# Patient Record
Sex: FEMALE | Race: WHITE | Employment: FULL TIME | URBAN - METROPOLITAN AREA
[De-identification: names, ages, dates, MRNs, and addresses within clinical notes are randomized per-mention and may not be internally consistent; named-entity substitution may affect disease eponyms.]

---

## 2017-05-25 ENCOUNTER — EMPLOYEE WELLNESS (OUTPATIENT)
Dept: OTHER | Age: 37
End: 2017-05-25

## 2017-05-25 LAB
CHOLESTEROL, TOTAL: 187 MG/DL (ref 0–199)
FASTING: YES
GLUCOSE BLD-MCNC: 86 MG/DL (ref 74–109)
HDLC SERPL-MCNC: 83 MG/DL (ref 40–90)
LDL CHOLESTEROL CALCULATED: 95 MG/DL
TRIGL SERPL-MCNC: 45 MG/DL (ref 0–199)

## 2018-03-07 ENCOUNTER — EMPLOYEE WELLNESS (OUTPATIENT)
Dept: OTHER | Age: 38
End: 2018-03-07

## 2018-03-07 LAB
CHOLESTEROL, TOTAL: 211 MG/DL (ref 0–199)
FASTING: YES
GLUCOSE BLD-MCNC: 83 MG/DL (ref 74–109)
HDLC SERPL-MCNC: 96 MG/DL (ref 40–90)
LDL CHOLESTEROL CALCULATED: 100 MG/DL
TRIGL SERPL-MCNC: 76 MG/DL (ref 0–199)

## 2018-03-20 VITALS — BODY MASS INDEX: 23.78 KG/M2 | WEIGHT: 161 LBS

## 2018-04-02 VITALS — BODY MASS INDEX: 22.59 KG/M2 | WEIGHT: 153 LBS

## 2018-07-09 ENCOUNTER — OFFICE VISIT (OUTPATIENT)
Dept: FAMILY MEDICINE CLINIC | Age: 38
End: 2018-07-09
Payer: COMMERCIAL

## 2018-07-09 VITALS
RESPIRATION RATE: 12 BRPM | HEIGHT: 69 IN | HEART RATE: 72 BPM | DIASTOLIC BLOOD PRESSURE: 70 MMHG | WEIGHT: 155.5 LBS | BODY MASS INDEX: 23.03 KG/M2 | SYSTOLIC BLOOD PRESSURE: 118 MMHG

## 2018-07-09 DIAGNOSIS — Z00.00 WELL ADULT HEALTH CHECK: Primary | ICD-10-CM

## 2018-07-09 PROCEDURE — 99395 PREV VISIT EST AGE 18-39: CPT | Performed by: FAMILY MEDICINE

## 2018-07-09 ASSESSMENT — PATIENT HEALTH QUESTIONNAIRE - PHQ9
1. LITTLE INTEREST OR PLEASURE IN DOING THINGS: 0
2. FEELING DOWN, DEPRESSED OR HOPELESS: 0
SUM OF ALL RESPONSES TO PHQ9 QUESTIONS 1 & 2: 0
SUM OF ALL RESPONSES TO PHQ QUESTIONS 1-9: 0

## 2018-07-09 ASSESSMENT — ENCOUNTER SYMPTOMS
RESPIRATORY NEGATIVE: 1
GASTROINTESTINAL NEGATIVE: 1

## 2018-07-09 NOTE — PROGRESS NOTES
Subjective:      Patient ID: Bianca Rhoades is a 41 y.o. female. HPI:    Chief Complaint   Patient presents with    Annual Exam     Annual wellness. Labs look great. Well female UTD, Cristofer Chamberlainanastacio. There is no problem list on file for this patient. There is no problem list on file for this patient. No past surgical history on file. Review of Systems   Constitutional: Negative. HENT: Negative. Respiratory: Negative. Cardiovascular: Negative. Gastrointestinal: Negative. Musculoskeletal: Negative. All other systems reviewed and are negative. Objective:   Physical Exam   Constitutional: She is oriented to person, place, and time. She appears well-developed and well-nourished. HENT:   Head: Normocephalic and atraumatic. Right Ear: Tympanic membrane normal.   Left Ear: Tympanic membrane normal.   Mouth/Throat: Oropharynx is clear and moist and mucous membranes are normal.   Cardiovascular: Normal rate, regular rhythm and normal heart sounds. No murmur heard. Pulmonary/Chest: Effort normal and breath sounds normal.   Abdominal: Soft. Bowel sounds are normal.   Musculoskeletal: She exhibits no edema. Neurological: She is alert and oriented to person, place, and time. Skin: Skin is warm and dry. Psychiatric: She has a normal mood and affect. Her behavior is normal.   Nursing note and vitals reviewed. Assessment:       Diagnosis Orders   1.  Well adult health check             Plan:      -  Healthy lifestyle discussed  -  Well female UTD  -  Labs reviewed, look great  -  RTO prn

## 2018-07-09 NOTE — PROGRESS NOTES
Visit Information    Have you changed or started any medications since your last visit including any over-the-counter medicines, vitamins, or herbal medicines? no   Are you having any side effects from any of your medications? -  no  Have you stopped taking any of your medications? Is so, why? -  no    Have you seen any other physician or provider since your last visit? No  Have you had any other diagnostic tests since your last visit? No  Have you been seen in the emergency room and/or had an admission to a hospital since we last saw you? No  Have you had your routine dental cleaning in the past 6 months? yes - 5/2018    Have you activated your [employer] account? If not, what are your barriers?  Yes     Patient Care Team:  Jocelyn Emerson, DO as PCP - [facility] [employer] [facility], DO as PCP - [facility] Attributed Provider  Osiris Perales MD as Obstetrician (Obstetrics & Gynecology)    Medical History Review  Past Medical, Family, and Social History reviewed and does contribute to the patient presenting condition    Health Maintenance   Topic Date Due    DTaP/Tdap/Td vaccine (1 - Tdap) 01/29/2001    Cervical cancer screen  01/29/2003    Flu vaccine (1) 09/01/2018    HIV screen  Completed

## 2018-07-27 ENCOUNTER — HOSPITAL ENCOUNTER (OUTPATIENT)
Dept: MAMMOGRAPHY | Age: 38
Discharge: HOME | End: 2018-07-27
Payer: COMMERCIAL

## 2018-07-27 DIAGNOSIS — Z12.31 VISIT FOR SCREENING MAMMOGRAM: ICD-10-CM

## 2018-07-27 PROCEDURE — 77067 SCR MAMMO BI INCL CAD: CPT

## 2018-09-20 ENCOUNTER — OFFICE VISIT (OUTPATIENT)
Dept: FAMILY MEDICINE CLINIC | Age: 38
End: 2018-09-20
Payer: COMMERCIAL

## 2018-09-20 VITALS
DIASTOLIC BLOOD PRESSURE: 68 MMHG | WEIGHT: 148.5 LBS | SYSTOLIC BLOOD PRESSURE: 106 MMHG | RESPIRATION RATE: 16 BRPM | BODY MASS INDEX: 21.26 KG/M2 | HEART RATE: 72 BPM | HEIGHT: 70 IN

## 2018-09-20 DIAGNOSIS — Z41.1 ENCOUNTER FOR BREAST AUGMENTATION: ICD-10-CM

## 2018-09-20 DIAGNOSIS — Z01.818 PRE-OP EVALUATION: Primary | ICD-10-CM

## 2018-09-20 PROCEDURE — 99243 OFF/OP CNSLTJ NEW/EST LOW 30: CPT | Performed by: FAMILY MEDICINE

## 2018-09-20 ASSESSMENT — ENCOUNTER SYMPTOMS
GASTROINTESTINAL NEGATIVE: 1
RESPIRATORY NEGATIVE: 1

## 2019-04-05 LAB
CHOLESTEROL, TOTAL: 200 MG/DL (ref 0–199)
FASTING: YES
GLUCOSE BLD-MCNC: 79 MG/DL (ref 74–109)
HDLC SERPL-MCNC: 86 MG/DL (ref 40–90)
LDL CHOLESTEROL CALCULATED: 104 MG/DL
TRIGL SERPL-MCNC: 49 MG/DL (ref 0–199)

## 2021-08-18 LAB
CHOLESTEROL, TOTAL: 194 MG/DL (ref 0–199)
FASTING: YES
GLUCOSE BLD-MCNC: 66 MG/DL (ref 74–109)
HDLC SERPL-MCNC: 78 MG/DL (ref 40–90)
LDL CHOLESTEROL CALCULATED: 103 MG/DL
TRIGL SERPL-MCNC: 66 MG/DL (ref 0–199)

## 2022-01-14 ENCOUNTER — PATIENT MESSAGE (OUTPATIENT)
Dept: FAMILY MEDICINE CLINIC | Age: 42
End: 2022-01-14

## 2022-01-14 RX ORDER — AMOXICILLIN AND CLAVULANATE POTASSIUM 875; 125 MG/1; MG/1
1 TABLET, FILM COATED ORAL 2 TIMES DAILY WITH MEALS
Qty: 14 TABLET | Refills: 0 | Status: SHIPPED | OUTPATIENT
Start: 2022-01-14 | End: 2022-01-21

## 2022-01-14 NOTE — TELEPHONE ENCOUNTER
From: Bing Primrose  To: Dr. Elissa Vidales  Sent: 1/14/2022 8:36 AM EST  Subject: Antibiotic    Hey Dr. Mike Hein  I'm pretty sure i have a sinus infection. Symptoms for 14 days. I probably originally had omicron but i continue to have tons of green nasal drainage, post nasal drip, and sore throat. Are you able to send in an antibiotic to [facility]? I'm allergic to Sulfa.

## 2022-08-03 ENCOUNTER — TELEMEDICINE (OUTPATIENT)
Dept: FAMILY MEDICINE CLINIC | Age: 42
End: 2022-08-03
Payer: COMMERCIAL

## 2022-08-03 DIAGNOSIS — R31.0 GROSS HEMATURIA: Primary | ICD-10-CM

## 2022-08-03 PROCEDURE — 99213 OFFICE O/P EST LOW 20 MIN: CPT | Performed by: FAMILY MEDICINE

## 2022-08-03 ASSESSMENT — ENCOUNTER SYMPTOMS
RESPIRATORY NEGATIVE: 1
GASTROINTESTINAL NEGATIVE: 1
ABDOMINAL PAIN: 0

## 2022-08-03 NOTE — PROGRESS NOTES
Flora Hercules (:  1980) is a Established patient, here for evaluation of the following:           Subjective   HPI:    Chief Complaint   Patient presents with    Hematuria     Pt presents today with c/o blood in urine that started last week. Overall better this week after increasing her fluid intake. No other urinary symptoms. No hx of stones. No vaginal bleeding noted. No abdominal or flank pain. Recently started exercising prior to the gross hematuria, worse after running a 5K. Non-smoker. There is no problem list on file for this patient. No past surgical history on file. Social History     Tobacco Use    Smoking status: Never    Smokeless tobacco: Never   Substance Use Topics    Alcohol use: No    Drug use: No       Review of Systems   Constitutional: Negative. HENT: Negative. Respiratory: Negative. Cardiovascular: Negative. Gastrointestinal: Negative. Negative for abdominal pain. Genitourinary:  Positive for hematuria. Negative for difficulty urinating, dysuria, flank pain, frequency, menstrual problem, pelvic pain, urgency, vaginal bleeding and vaginal discharge. Musculoskeletal: Negative. All other systems reviewed and are negative. Objective   Patient-Reported Vitals  No data recorded     Physical Exam  Constitutional:       General: She is not in acute distress. Appearance: Normal appearance. She is well-developed. She is not ill-appearing. HENT:      Head: Normocephalic and atraumatic. Right Ear: External ear normal.      Left Ear: External ear normal.   Eyes:      Conjunctiva/sclera: Conjunctivae normal.   Pulmonary:      Effort: Pulmonary effort is normal. No respiratory distress. Skin:     Findings: No rash (on exposed surfaces). Neurological:      Mental Status: She is alert and oriented to person, place, and time.    Psychiatric:         Mood and Affect: Mood normal.         Behavior: Behavior normal.         Thought Content: Thought content normal.         Judgment: Judgment normal.     Assessment & Plan   Below is the assessment and plan developed based on review of pertinent history, physical exam, labs, studies, and medications. 1. Gross hematuria    -  no associated symptoms at this time  -  improving with fluid intake  -  will continue to monitor    Return if symptoms worsen or fail to improve. Romana Tee, was evaluated through a synchronous (real-time) audio-video encounter. The patient (or guardian if applicable) is aware that this is a billable service, which includes applicable co-pays. This Virtual Visit was conducted with patient's (and/or legal guardian's) consent. The visit was conducted pursuant to the emergency declaration under the Formerly named [facility], [address] [place] [address] waiver authority and the [employer] Act. Patient identification was verified, and a caregiver was present when appropriate. The patient was located at Home: [address] [facility]. Provider was located at [facility] (Appt Dept): [address]  [place],  [address].         --Yareli Pena DO

## 2022-08-26 RX ORDER — NITROFURANTOIN 25; 75 MG/1; MG/1
100 CAPSULE ORAL 2 TIMES DAILY
Qty: 10 CAPSULE | Refills: 0 | Status: SHIPPED | OUTPATIENT
Start: 2022-08-26 | End: 2022-08-29 | Stop reason: SDUPTHER

## 2022-08-29 RX ORDER — NITROFURANTOIN 25; 75 MG/1; MG/1
100 CAPSULE ORAL 2 TIMES DAILY
Qty: 10 CAPSULE | Refills: 0 | Status: SHIPPED | OUTPATIENT
Start: 2022-08-29 | End: 2022-09-03

## 2022-09-02 ENCOUNTER — NURSE ONLY (OUTPATIENT)
Dept: LAB | Age: 42
End: 2022-09-02

## 2022-09-08 ENCOUNTER — PATIENT MESSAGE (OUTPATIENT)
Dept: FAMILY MEDICINE CLINIC | Age: 42
End: 2022-09-08

## 2022-09-08 DIAGNOSIS — R31.0 GROSS HEMATURIA: Primary | ICD-10-CM

## 2022-09-12 NOTE — TELEPHONE ENCOUNTER
From: Dr. Koffi Meléndez  To: Andie Reaves  Sent: 9/8/2022 11:45 AM EDT  Subject: Symptoms    Donnelly Mirza, just checking to see if your symptoms have totally resolved?

## 2022-09-15 LAB — CYTOLOGY THIN PREP PAP: NORMAL

## 2022-09-16 ENCOUNTER — HOSPITAL ENCOUNTER (OUTPATIENT)
Dept: CT IMAGING | Age: 42
Discharge: HOME | End: 2022-09-16
Payer: COMMERCIAL

## 2022-09-16 DIAGNOSIS — R31.0 GROSS HEMATURIA: ICD-10-CM

## 2022-09-16 PROCEDURE — 74176 CT ABD & PELVIS W/O CONTRAST: CPT

## 2023-01-04 RX ORDER — KETOROLAC TROMETHAMINE 10 MG/1
10 TABLET, FILM COATED ORAL EVERY 6 HOURS PRN
Qty: 20 TABLET | Refills: 0 | Status: SHIPPED | OUTPATIENT
Start: 2023-01-04 | End: 2024-01-04

## 2023-01-04 RX ORDER — TAMSULOSIN HYDROCHLORIDE 0.4 MG/1
0.4 CAPSULE ORAL DAILY
Qty: 15 CAPSULE | Refills: 0 | Status: SHIPPED | OUTPATIENT
Start: 2023-01-04

## 2023-01-04 RX ORDER — ONDANSETRON 4 MG/1
4 TABLET, ORALLY DISINTEGRATING ORAL 3 TIMES DAILY PRN
Qty: 15 TABLET | Refills: 0 | Status: SHIPPED | OUTPATIENT
Start: 2023-01-04

## 2023-05-14 ENCOUNTER — HOSPITAL ENCOUNTER (OUTPATIENT)
Dept: GENERAL RADIOLOGY | Age: 43
Discharge: HOME | End: 2023-05-14
Payer: COMMERCIAL

## 2023-05-14 ENCOUNTER — HOSPITAL ENCOUNTER (OUTPATIENT)
Age: 43
Discharge: HOME | End: 2023-05-14
Payer: COMMERCIAL

## 2023-05-14 DIAGNOSIS — S69.92XA INJURY OF LEFT HAND, INITIAL ENCOUNTER: ICD-10-CM

## 2023-05-14 PROCEDURE — 73130 X-RAY EXAM OF HAND: CPT

## 2023-06-28 LAB
CHOLEST SERPL-MCNC: 207 MG/DL (ref 0–199)
FASTING: YES
GLUCOSE SERPL-MCNC: 84 MG/DL (ref 74–109)
HDLC SERPL-MCNC: 76 MG/DL (ref 40–90)
LDLC SERPL CALC-MCNC: 119 MG/DL
TRIGL SERPL-MCNC: 60 MG/DL (ref 0–199)

## 2023-07-31 RX ORDER — NITROFURANTOIN 25; 75 MG/1; MG/1
100 CAPSULE ORAL 2 TIMES DAILY
Qty: 10 CAPSULE | Refills: 0 | Status: SHIPPED | OUTPATIENT
Start: 2023-07-31 | End: 2023-08-05

## 2023-08-06 DIAGNOSIS — N39.0 UTI (URINARY TRACT INFECTION): ICD-10-CM

## 2023-08-06 RX ORDER — CIPROFLOXACIN 250 MG/1
250 TABLET, FILM COATED ORAL 2 TIMES DAILY
Qty: 6 TABLET | Refills: 0 | Status: SHIPPED | OUTPATIENT
Start: 2023-08-06 | End: 2023-08-09

## 2023-08-06 RX ORDER — KETOROLAC TROMETHAMINE 10 MG/1
10 TABLET, FILM COATED ORAL EVERY 6 HOURS PRN
Qty: 20 TABLET | Refills: 0 | Status: SHIPPED | OUTPATIENT
Start: 2023-08-06 | End: 2024-08-05

## 2023-08-06 RX ORDER — TAMSULOSIN HYDROCHLORIDE 0.4 MG/1
0.4 CAPSULE ORAL DAILY
Qty: 15 CAPSULE | Refills: 0 | Status: SHIPPED | OUTPATIENT
Start: 2023-08-06

## 2023-08-19 ENCOUNTER — HOSPITAL ENCOUNTER (OUTPATIENT)
Age: 43
Discharge: HOME | End: 2023-08-19
Payer: COMMERCIAL

## 2023-08-19 ENCOUNTER — HOSPITAL ENCOUNTER (EMERGENCY)
Age: 43
Discharge: HOME | End: 2023-08-19
Payer: COMMERCIAL

## 2023-08-19 ENCOUNTER — APPOINTMENT (OUTPATIENT)
Dept: CT IMAGING | Age: 43
End: 2023-08-19
Payer: COMMERCIAL

## 2023-08-19 VITALS
SYSTOLIC BLOOD PRESSURE: 116 MMHG | OXYGEN SATURATION: 100 % | TEMPERATURE: 98 F | HEART RATE: 69 BPM | DIASTOLIC BLOOD PRESSURE: 64 MMHG | RESPIRATION RATE: 14 BRPM

## 2023-08-19 DIAGNOSIS — N20.0 KIDNEY STONE: Primary | ICD-10-CM

## 2023-08-19 LAB
AMORPH SED URNS QL MICRO: ABNORMAL
BACTERIA: ABNORMAL
BILIRUB UR QL STRIP: NEGATIVE
CASTS #/AREA URNS LPF: ABNORMAL /LPF
CHARACTER UR: ABNORMAL
COLOR UR: YELLOW
CRYSTALS URNS QL MICRO: ABNORMAL
EPITHELIAL CELLS, UA: ABNORMAL /HPF
GLUCOSE UR QL STRIP.AUTO: NEGATIVE MG/DL
HGB UR QL STRIP.AUTO: ABNORMAL
KETONES UR QL STRIP.AUTO: NEGATIVE
LEUKOCYTE ESTERASE UR QL STRIP.AUTO: NEGATIVE
MUCOUS THREADS URNS QL MICRO: ABNORMAL
NITRITE UR QL STRIP.AUTO: NEGATIVE
PH UR STRIP.AUTO: 6 [PH] (ref 5–9)
PROT UR STRIP.AUTO-MCNC: ABNORMAL MG/DL
RBC #/AREA URNS HPF: ABNORMAL /HPF
RENAL EPI CELLS #/AREA URNS HPF: ABNORMAL /[HPF]
SP GR UR REFRACT.AUTO: >= 1.03 (ref 1–1.03)
UROBILINOGEN UR QL STRIP.AUTO: 0.2 EU/DL (ref 0–1)
WBC #/AREA URNS HPF: ABNORMAL /HPF
YEAST LIKE FUNGI URNS QL MICRO: ABNORMAL

## 2023-08-19 PROCEDURE — 99213 OFFICE O/P EST LOW 20 MIN: CPT | Performed by: NURSE PRACTITIONER

## 2023-08-19 PROCEDURE — 99214 OFFICE O/P EST MOD 30 MIN: CPT

## 2023-08-19 PROCEDURE — 96374 THER/PROPH/DIAG INJ IV PUSH: CPT

## 2023-08-19 PROCEDURE — 6370000000 HC RX 637 (ALT 250 FOR IP): Performed by: NURSE PRACTITIONER

## 2023-08-19 PROCEDURE — 81001 URINALYSIS AUTO W/SCOPE: CPT

## 2023-08-19 PROCEDURE — 74176 CT ABD & PELVIS W/O CONTRAST: CPT

## 2023-08-19 PROCEDURE — 96361 HYDRATE IV INFUSION ADD-ON: CPT

## 2023-08-19 PROCEDURE — 2580000003 HC RX 258: Performed by: NURSE PRACTITIONER

## 2023-08-19 PROCEDURE — 87086 URINE CULTURE/COLONY COUNT: CPT

## 2023-08-19 PROCEDURE — 6360000002 HC RX W HCPCS: Performed by: NURSE PRACTITIONER

## 2023-08-19 RX ORDER — HYDROCODONE BITARTRATE AND ACETAMINOPHEN 5; 325 MG/1; MG/1
1 TABLET ORAL ONCE
Status: COMPLETED | OUTPATIENT
Start: 2023-08-19 | End: 2023-08-19

## 2023-08-19 RX ORDER — ONDANSETRON 4 MG/1
4 TABLET, ORALLY DISINTEGRATING ORAL 3 TIMES DAILY PRN
Qty: 21 TABLET | Refills: 0 | Status: SHIPPED | OUTPATIENT
Start: 2023-08-19

## 2023-08-19 RX ORDER — HYDROCODONE BITARTRATE AND ACETAMINOPHEN 5; 325 MG/1; MG/1
1 TABLET ORAL EVERY 6 HOURS PRN
Qty: 10 TABLET | Refills: 0 | Status: SHIPPED | OUTPATIENT
Start: 2023-08-19 | End: 2023-08-22

## 2023-08-19 RX ORDER — 0.9 % SODIUM CHLORIDE 0.9 %
1000 INTRAVENOUS SOLUTION INTRAVENOUS ONCE
Status: COMPLETED | OUTPATIENT
Start: 2023-08-19 | End: 2023-08-19

## 2023-08-19 RX ORDER — HYDROCODONE BITARTRATE AND ACETAMINOPHEN 5; 325 MG/1; MG/1
2 TABLET ORAL ONCE
Status: DISCONTINUED | OUTPATIENT
Start: 2023-08-19 | End: 2023-08-19

## 2023-08-19 RX ORDER — TAMSULOSIN HYDROCHLORIDE 0.4 MG/1
0.4 CAPSULE ORAL DAILY
Status: DISCONTINUED | OUTPATIENT
Start: 2023-08-19 | End: 2023-08-19

## 2023-08-19 RX ORDER — 0.9 % SODIUM CHLORIDE 0.9 %
500 INTRAVENOUS SOLUTION INTRAVENOUS ONCE
Status: DISCONTINUED | OUTPATIENT
Start: 2023-08-19 | End: 2023-08-19

## 2023-08-19 RX ORDER — ONDANSETRON 4 MG/1
4 TABLET, ORALLY DISINTEGRATING ORAL ONCE
Status: COMPLETED | OUTPATIENT
Start: 2023-08-19 | End: 2023-08-19

## 2023-08-19 RX ORDER — KETOROLAC TROMETHAMINE 30 MG/ML
30 INJECTION, SOLUTION INTRAMUSCULAR; INTRAVENOUS ONCE
Status: COMPLETED | OUTPATIENT
Start: 2023-08-19 | End: 2023-08-19

## 2023-08-19 RX ORDER — ONDANSETRON 2 MG/ML
4 INJECTION INTRAMUSCULAR; INTRAVENOUS ONCE
Status: DISCONTINUED | OUTPATIENT
Start: 2023-08-19 | End: 2023-08-19 | Stop reason: HOSPADM

## 2023-08-19 RX ADMIN — SODIUM CHLORIDE 1000 ML: 9 INJECTION, SOLUTION INTRAVENOUS at 10:15

## 2023-08-19 RX ADMIN — HYDROCODONE BITARTRATE AND ACETAMINOPHEN 1 TABLET: 5; 325 TABLET ORAL at 10:00

## 2023-08-19 RX ADMIN — ONDANSETRON 4 MG: 4 TABLET, ORALLY DISINTEGRATING ORAL at 09:46

## 2023-08-19 RX ADMIN — KETOROLAC TROMETHAMINE 30 MG: 30 INJECTION, SOLUTION INTRAMUSCULAR; INTRAVENOUS at 10:01

## 2023-08-19 ASSESSMENT — PAIN DESCRIPTION - DESCRIPTORS
DESCRIPTORS: SHARP;STABBING
DESCRIPTORS: SHARP
DESCRIPTORS: SHARP

## 2023-08-19 ASSESSMENT — PAIN SCALES - GENERAL
PAINLEVEL_OUTOF10: 7
PAINLEVEL_OUTOF10: 7
PAINLEVEL_OUTOF10: 5

## 2023-08-19 ASSESSMENT — PAIN DESCRIPTION - LOCATION
LOCATION: FLANK;ABDOMEN
LOCATION: FLANK
LOCATION: FLANK;ABDOMEN

## 2023-08-19 ASSESSMENT — PAIN DESCRIPTION - ORIENTATION
ORIENTATION: RIGHT;LOWER
ORIENTATION: RIGHT
ORIENTATION: RIGHT;LOWER

## 2023-08-19 ASSESSMENT — PAIN DESCRIPTION - FREQUENCY
FREQUENCY: CONTINUOUS
FREQUENCY: CONTINUOUS

## 2023-08-19 ASSESSMENT — PAIN DESCRIPTION - PAIN TYPE
TYPE: ACUTE PAIN
TYPE: ACUTE PAIN

## 2023-08-19 ASSESSMENT — PAIN - FUNCTIONAL ASSESSMENT: PAIN_FUNCTIONAL_ASSESSMENT: 0-10

## 2023-08-19 ASSESSMENT — ENCOUNTER SYMPTOMS: NAUSEA: 1

## 2023-08-19 NOTE — ED NOTES
Patient returned from the bathroom at thistime, call light within reach and blankets given. No other needs.       Alejandro Min RN  08/19/23 8130

## 2023-08-19 NOTE — ED TRIAGE NOTES
Patient here for sharp right flank pain and lower abdominal pressure worse last night. Patient has been on Janice Fellers in the end of July and then Cipro for 5 days after Macrobid. She is currently not on any antibiotics at this time and pain was back again last night.

## 2023-08-19 NOTE — ED PROVIDER NOTES
moist.      Pharynx: Oropharynx is clear. Eyes:      Extraocular Movements: Extraocular movements intact. Conjunctiva/sclera: Conjunctivae normal.      Pupils: Pupils are equal, round, and reactive to light. Cardiovascular:      Rate and Rhythm: Normal rate and regular rhythm. Pulses: Normal pulses. Heart sounds: Normal heart sounds. No murmur heard. Pulmonary:      Effort: Pulmonary effort is normal. No respiratory distress. Breath sounds: Normal breath sounds. No wheezing. Abdominal:      General: Abdomen is flat. Palpations: Abdomen is soft. Tenderness: There is no abdominal tenderness. Musculoskeletal:         General: No swelling or deformity. Normal range of motion. Cervical back: Normal range of motion and neck supple. Skin:     General: Skin is warm and dry. Capillary Refill: Capillary refill takes less than 2 seconds. Findings: No rash. Neurological:      General: No focal deficit present. Mental Status: She is alert and oriented to person, place, and time. Mental status is at baseline. Psychiatric:         Mood and Affect: Mood normal.         Behavior: Behavior normal.         Thought Content: Thought content normal.         Judgment: Judgment normal.       DIAGNOSTIC RESULTS   Labs:No results found for this visit on 08/19/23. IMAGING:  CT ABDOMEN PELVIS WO CONTRAST Additional Contrast? None   Final Result   1. A 5 mm obstructing right ureteral calculus is seen just proximal to the right ureterovesical junction. Moderate right hydronephrosis and hydroureter as a result. 2. Bilateral nephrolithiasis. **This report has been created using voice recognition software. It may contain minor errors which are inherent in voice recognition technology. **      Final report electronically signed by Dr Maggie Frausto on 8/19/2023 9:29 AM        URGENT CARE COURSE:         Medications   ondansetron (ZOFRAN) injection 4 mg (has no administration in time range)   ondansetron (ZOFRAN-ODT) disintegrating tablet 4 mg (4 mg Oral Given 8/19/23 0946)   ketorolac (TORADOL) injection 30 mg (30 mg IntraVENous Given 8/19/23 1001)   HYDROcodone-acetaminophen (NORCO) 5-325 MG per tablet 1 tablet (1 tablet Oral Given 8/19/23 1000)   sodium chloride 0.9 % bolus 1,000 mL (0 mLs IntraVENous Stopped 8/19/23 1118)     PROCEDURES:  FINALIMPRESSION      1. Kidney stone        DISPOSITION/PLAN   DISPOSITION Decision To Discharge 08/19/2023 11:13:04 AM    Patient's UA showed moderate blood. Negative leukocytes. CT abdomen pelvis without contrast showed a 5 mm obstructing right ureteral calculus seen just proximal to the right UVJ. Moderate right hydronephrosis and hydroureter as a result. Bilateral nephrolithiasis. Patient was given 1 L normal saline, Toradol 30 mg IV, Zofran 4 mg p.o. Vicodin p.o. She did have 1 episode of emesis after Vicodin and Zofran. After Toradol and fluids patient states that she is feeling much better and pain-free. Speak with Dr. Pau Segundo with urology. Recommended follow-up with urology Monday. An appointment was with urology at 8 AM Monday. Will Zofran and Vicodin. Continue home Flomax  daily at home. Advised report to ER with new or severe symptoms. PATIENT REFERRED TO:  Aminata Shine DO  [address] [place] [address], [address] [address] [phone]          DISCHARGE MEDICATIONS:  New Prescriptions    HYDROCODONE-ACETAMINOPHEN (NORCO) 5-325 MG PER TABLET    Take 1 tablet by mouth every 6 hours as needed for Pain for up to 3 days. Intended supply: 3 days.  Take lowest dose possible to manage pain Max Daily Amount: 4 tablets    ONDANSETRON (ZOFRAN-ODT) 4 MG DISINTEGRATING TABLET    Take 1 tablet by mouth 3 times daily as needed for Nausea or Vomiting     Current Discharge Medication List        CONTINUE these medications which have CHANGED    Details   ondansetron (ZOFRAN-ODT) 4 MG disintegrating tablet Take 1 tablet

## 2023-08-19 NOTE — ED NOTES
Patient states she feels better and going to the bathroom at this time.       Jason Balderas RN  08/19/23 8240

## 2023-08-19 NOTE — DISCHARGE INSTRUCTIONS
Continue Flomax 0.4 mg once daily. An appointment has been made for you with the urology clinic at 8 AM on Monday. Continue Norco and Zofran as needed for pain and nausea. If pain becomes too severe please report to the emergency room.

## 2023-08-19 NOTE — ED NOTES
Patient discharge instructions given to pt and pt verbalized understanding, INT removed and gauze and a band aid applied, appt made for Monday, no other needs at this time, and pt left in stable condition with spouse.      Ben Abad RN  08/19/23 1583

## 2023-08-20 LAB — BACTERIA UR CULT: NORMAL

## 2023-08-21 ENCOUNTER — TELEPHONE (OUTPATIENT)
Dept: UROLOGY | Age: 43
End: 2023-08-21

## 2023-08-21 ENCOUNTER — OFFICE VISIT (OUTPATIENT)
Dept: UROLOGY | Age: 43
End: 2023-08-21
Payer: COMMERCIAL

## 2023-08-21 ENCOUNTER — TELEPHONE (OUTPATIENT)
Dept: FAMILY MEDICINE CLINIC | Age: 43
End: 2023-08-21

## 2023-08-21 VITALS
TEMPERATURE: 98 F | BODY MASS INDEX: 25.34 KG/M2 | SYSTOLIC BLOOD PRESSURE: 128 MMHG | DIASTOLIC BLOOD PRESSURE: 74 MMHG | HEIGHT: 70 IN | WEIGHT: 177 LBS

## 2023-08-21 DIAGNOSIS — N13.30 HYDRONEPHROSIS OF RIGHT KIDNEY: ICD-10-CM

## 2023-08-21 DIAGNOSIS — N20.0 BILATERAL NEPHROLITHIASIS: Primary | ICD-10-CM

## 2023-08-21 DIAGNOSIS — N20.1 CALCULUS OF DISTAL RIGHT URETER: ICD-10-CM

## 2023-08-21 LAB
BACTERIA UR CULT: NORMAL
BILIRUBIN URINE: NEGATIVE
BLOOD URINE, POC: ABNORMAL
CHARACTER, URINE: CLEAR
COLOR, URINE: YELLOW
GLUCOSE URINE: NEGATIVE MG/DL
KETONES, URINE: NEGATIVE
LEUKOCYTE CLUMPS, URINE: ABNORMAL
NITRITE, URINE: NEGATIVE
PH, URINE: 6.5 (ref 5–9)
PROTEIN, URINE: NEGATIVE MG/DL
SPECIFIC GRAVITY, URINE: 1.01 (ref 1–1.03)
UROBILINOGEN, URINE: 0.2 EU/DL (ref 0–1)

## 2023-08-21 PROCEDURE — 81003 URINALYSIS AUTO W/O SCOPE: CPT

## 2023-08-21 PROCEDURE — 99204 OFFICE O/P NEW MOD 45 MIN: CPT

## 2023-08-21 NOTE — TELEPHONE ENCOUNTER
Patient scheduled for US RENAL COMP  at [facility] MR on 9/15/2023 ARRIVAL OF 845AM FOR A 9AM SCAN. NO CARBONATED BEVERAGES; ARRIVE WELL HYDRATED WITH A FULL BLADDER.     Order with instructions given to the patient in the office

## 2023-08-21 NOTE — PROGRESS NOTES
[address] [facility] [address]  [address] [address]  Dept: [phone]  Loc: [phone]  Visit Date: 8/21/2023    Patient:  Kathia Loredo  YOB: 1980  Date: 8/21/2023    HPI  The patient is a 41 y.o. female who presents today as a new patient for ED kidney stone follow-up. Shaggy Luna [facility] for evaluation of right-sided flank pain extending into the RLQ, associated with nausea. Patient was found to have a 5 mm calculus in the right distal ureter and numerous punctate calculi in each kidney. UA negative for infection. No BMP or CBC done, patient denies fever or chills. Has never required surgical intervention for kidney stones or been established with a Urologist. No family history of kidney stones. Drinking 4-5 cups of coffee a day. Less than 30 oz of water a day. No tea or soda. Pain Scale 5/10  UA: Large Blood, Trace Leukocytes.   Lab Results   Component Value Date/Time    COLORU Yellow 08/21/2023 10:01 AM    COLORU YELLOW 08/19/2023 08:46 AM    LABSPEC 1.015 08/21/2023 10:01 AM    LABPH 6.50 08/21/2023 10:01 AM    NITRU Negative 08/21/2023 10:01 AM    GLUCOSEU Negative 08/21/2023 10:01 AM    KETUA Negative 08/21/2023 10:01 AM    UROBILINOGEN 0.20 08/21/2023 10:01 AM    BILIRUBINUR Negative 08/21/2023 10:01 AM    BILIRUBINUR NEGATIVE 02/07/2012 09:40 PM          Urology History    -History of hematuria: No  -Personal use or history of blood thinner use: No  -Personal or family history of bladder cancer: No  -History of smoking: No  -History of urinary retention: No  -History of kidney stones: Yes  - Surgical history: No  -Previous cystoscopy: No  -Emptying bladder: No         Last BUN and creatinine:  Lab Results   Component Value Date    BUN 12 04/02/2016     Lab Results   Component Value Date    CREATININE 0.7 04/02/2016       Imaging Reviewed during this Office

## 2023-08-22 ENCOUNTER — NURSE ONLY (OUTPATIENT)
Dept: LAB | Age: 43
End: 2023-08-22

## 2023-08-22 DIAGNOSIS — N20.1 CALCULUS OF DISTAL RIGHT URETER: ICD-10-CM

## 2023-08-22 DIAGNOSIS — N20.0 BILATERAL NEPHROLITHIASIS: ICD-10-CM

## 2023-08-22 LAB
ANION GAP SERPL CALC-SCNC: 15 MEQ/L (ref 8–16)
BASOPHILS ABSOLUTE: 0 THOU/MM3 (ref 0–0.1)
BASOPHILS NFR BLD AUTO: 0.6 %
BUN SERPL-MCNC: 6 MG/DL (ref 7–22)
CALCIUM SERPL-MCNC: 9.8 MG/DL (ref 8.5–10.5)
CHLORIDE SERPL-SCNC: 103 MEQ/L (ref 98–111)
CO2 SERPL-SCNC: 22 MEQ/L (ref 23–33)
CREAT SERPL-MCNC: 0.6 MG/DL (ref 0.4–1.2)
DEPRECATED RDW RBC AUTO: 41 FL (ref 35–45)
EOSINOPHIL NFR BLD AUTO: 4.3 %
EOSINOPHILS ABSOLUTE: 0.3 THOU/MM3 (ref 0–0.4)
ERYTHROCYTE [DISTWIDTH] IN BLOOD BY AUTOMATED COUNT: 11.7 % (ref 11.5–14.5)
GFR SERPL CREATININE-BSD FRML MDRD: > 60 ML/MIN/1.73M2
GLUCOSE SERPL-MCNC: 76 MG/DL (ref 70–108)
HCT VFR BLD AUTO: 42.6 % (ref 37–47)
HGB BLD-MCNC: 13.8 GM/DL (ref 12–16)
IMM GRANULOCYTES # BLD AUTO: 0.04 THOU/MM3 (ref 0–0.07)
IMM GRANULOCYTES NFR BLD AUTO: 0.6 %
LYMPHOCYTES ABSOLUTE: 2.3 THOU/MM3 (ref 1–4.8)
LYMPHOCYTES NFR BLD AUTO: 34.7 %
MCH RBC QN AUTO: 30.9 PG (ref 26–33)
MCHC RBC AUTO-ENTMCNC: 32.4 GM/DL (ref 32.2–35.5)
MCV RBC AUTO: 95.5 FL (ref 81–99)
MONOCYTES ABSOLUTE: 0.5 THOU/MM3 (ref 0.4–1.3)
MONOCYTES NFR BLD AUTO: 8.3 %
NEUTROPHILS NFR BLD AUTO: 51.5 %
NRBC BLD AUTO-RTO: 0 /100 WBC
PLATELET # BLD AUTO: 264 THOU/MM3 (ref 130–400)
PMV BLD AUTO: 10.4 FL (ref 9.4–12.4)
POTASSIUM SERPL-SCNC: 4.5 MEQ/L (ref 3.5–5.2)
RBC # BLD AUTO: 4.46 MILL/MM3 (ref 4.2–5.4)
SEGMENTED NEUTROPHILS ABSOLUTE COUNT: 3.3 THOU/MM3 (ref 1.8–7.7)
SODIUM SERPL-SCNC: 140 MEQ/L (ref 135–145)
WBC # BLD AUTO: 6.5 THOU/MM3 (ref 4.8–10.8)

## 2023-09-15 ENCOUNTER — HOSPITAL ENCOUNTER (OUTPATIENT)
Dept: ULTRASOUND IMAGING | Age: 43
Discharge: HOME | End: 2023-09-15
Payer: COMMERCIAL

## 2023-09-15 DIAGNOSIS — N20.0 BILATERAL NEPHROLITHIASIS: ICD-10-CM

## 2023-09-15 PROCEDURE — 76770 US EXAM ABDO BACK WALL COMP: CPT

## 2023-09-19 ENCOUNTER — HOSPITAL ENCOUNTER (OUTPATIENT)
Age: 43
Discharge: HOME | End: 2023-09-19
Payer: COMMERCIAL

## 2023-09-19 ENCOUNTER — HOSPITAL ENCOUNTER (OUTPATIENT)
Dept: GENERAL RADIOLOGY | Age: 43
Discharge: HOME | End: 2023-09-19
Payer: COMMERCIAL

## 2023-09-19 DIAGNOSIS — N20.0 BILATERAL NEPHROLITHIASIS: ICD-10-CM

## 2023-09-19 PROCEDURE — 74018 RADEX ABDOMEN 1 VIEW: CPT

## 2023-09-22 ENCOUNTER — TELEPHONE (OUTPATIENT)
Dept: UROLOGY | Age: 43
End: 2023-09-22

## 2023-09-22 ENCOUNTER — OFFICE VISIT (OUTPATIENT)
Dept: UROLOGY | Age: 43
End: 2023-09-22
Payer: COMMERCIAL

## 2023-09-22 VITALS — HEIGHT: 69 IN | RESPIRATION RATE: 18 BRPM | BODY MASS INDEX: 26.22 KG/M2 | WEIGHT: 177 LBS

## 2023-09-22 DIAGNOSIS — N13.30 HYDRONEPHROSIS OF RIGHT KIDNEY: ICD-10-CM

## 2023-09-22 DIAGNOSIS — N20.0 BILATERAL NEPHROLITHIASIS: Primary | ICD-10-CM

## 2023-09-22 DIAGNOSIS — N13.30 BILATERAL HYDRONEPHROSIS: ICD-10-CM

## 2023-09-22 DIAGNOSIS — N20.1 CALCULUS OF DISTAL RIGHT URETER: ICD-10-CM

## 2023-09-22 LAB
BILIRUBIN URINE: NEGATIVE
BLOOD URINE, POC: ABNORMAL
CHARACTER, URINE: CLEAR
COLOR, URINE: YELLOW
GLUCOSE URINE: NEGATIVE MG/DL
KETONES, URINE: 15
LEUKOCYTE CLUMPS, URINE: NEGATIVE
NITRITE, URINE: NEGATIVE
PH, URINE: 7 (ref 5–9)
PROTEIN, URINE: NEGATIVE MG/DL
SPECIFIC GRAVITY, URINE: 1.02 (ref 1–1.03)
UROBILINOGEN, URINE: 1 EU/DL (ref 0–1)

## 2023-09-22 PROCEDURE — 99214 OFFICE O/P EST MOD 30 MIN: CPT

## 2023-09-22 PROCEDURE — 81003 URINALYSIS AUTO W/O SCOPE: CPT

## 2023-09-22 NOTE — TELEPHONE ENCOUNTER
Patient scheduled for US RENAL COMP  at [facility] MR on 3/15/2023. Arrival of 845AM for a 9AM scan time. NO CARBONATED BEVERAGES ARRIVE WELL HYDRATED WITH A FULL BLADDER.   Order mailed with instructions or given to the patient in the office

## 2023-09-22 NOTE — PROGRESS NOTES
[address]  [facility] [address]  Dept: [phone]  Loc: [phone]  Visit Date: 9/22/2023    Patient:  Bharath Pelaez  YOB: 1980  Date: 9/22/2023    HPI  The patient is a 41 y.o. female who presents today as a new patient for kidney stone follow-up. Jose Bacon passed! Pain resolved and [place] denies any lingering urinary symptoms or nausea. Prior to this most recent  kidney stone episode- [place] denies requiring surgical intervention for kidney stones or been established with a Urologist. No family history of kidney stones. Previously drinking 4-5 cups of coffee daily, now she has stopped drinking coffee entirely. Previously drinking less than 30 oz of water daily, now increased to >50 oz. Denies consumption of tea or soda. Pain Scale 0/10  UA: Small Blood.   Lab Results   Component Value Date/Time    COLORU Yellow 09/22/2023 08:15 AM    COLORU YELLOW 08/19/2023 08:46 AM    LABSPEC 1.020 09/22/2023 08:15 AM    LABPH 7.00 09/22/2023 08:15 AM    NITRU Negative 09/22/2023 08:15 AM    GLUCOSEU Negative 09/22/2023 08:15 AM    KETUA 15 09/22/2023 08:15 AM    UROBILINOGEN 1.00 09/22/2023 08:15 AM    BILIRUBINUR Negative 09/22/2023 08:15 AM    BILIRUBINUR NEGATIVE 02/07/2012 09:40 PM          Urology History    -History of hematuria: No  -Personal use or history of blood thinner use: No  -Personal or family history of bladder cancer: No  -History of smoking: No  -History of urinary retention: No  -History of kidney stones: Yes  - Surgical history: No  -Previous cystoscopy: No  -Emptying bladder: No         Last BUN and creatinine:  Lab Results   Component Value Date    BUN 6 (L) 08/22/2023     Lab Results   Component Value Date    CREATININE 0.6 08/22/2023       Imaging Reviewed during this Office Visit:   (results were independently reviewed by physician and radiology report verified)  I independently

## 2023-10-03 ENCOUNTER — HOSPITAL ENCOUNTER (OUTPATIENT)
Dept: MAMMOGRAPHY | Age: 43
Discharge: HOME | End: 2023-10-03
Payer: COMMERCIAL

## 2023-10-03 VITALS — WEIGHT: 177 LBS | HEIGHT: 69 IN | BODY MASS INDEX: 26.22 KG/M2

## 2023-10-03 DIAGNOSIS — Z12.31 VISIT FOR SCREENING MAMMOGRAM: ICD-10-CM

## 2023-10-03 PROCEDURE — 77063 BREAST TOMOSYNTHESIS BI: CPT

## 2024-03-15 ENCOUNTER — HOSPITAL ENCOUNTER (OUTPATIENT)
Dept: ULTRASOUND IMAGING | Age: 44
Discharge: HOME | End: 2024-03-15
Payer: COMMERCIAL

## 2024-03-15 DIAGNOSIS — N13.30 BILATERAL HYDRONEPHROSIS: ICD-10-CM

## 2024-03-15 PROCEDURE — 76770 US EXAM ABDO BACK WALL COMP: CPT

## 2024-03-21 ENCOUNTER — HOSPITAL ENCOUNTER (OUTPATIENT)
Age: 44
Discharge: HOME | End: 2024-03-21
Payer: COMMERCIAL

## 2024-03-21 ENCOUNTER — HOSPITAL ENCOUNTER (OUTPATIENT)
Dept: GENERAL RADIOLOGY | Age: 44
Discharge: HOME | End: 2024-03-21
Payer: COMMERCIAL

## 2024-03-21 DIAGNOSIS — N20.0 BILATERAL NEPHROLITHIASIS: ICD-10-CM

## 2024-03-21 PROCEDURE — 74018 RADEX ABDOMEN 1 VIEW: CPT

## 2024-03-22 ENCOUNTER — OFFICE VISIT (OUTPATIENT)
Dept: UROLOGY | Age: 44
End: 2024-03-22
Payer: COMMERCIAL

## 2024-03-22 VITALS — RESPIRATION RATE: 14 BRPM | HEIGHT: 69 IN | WEIGHT: 177 LBS | BODY MASS INDEX: 26.22 KG/M2

## 2024-03-22 DIAGNOSIS — N20.0 BILATERAL NEPHROLITHIASIS: Primary | ICD-10-CM

## 2024-03-22 LAB
BILIRUBIN URINE: NEGATIVE
BLOOD URINE, POC: ABNORMAL
CHARACTER, URINE: CLEAR
COLOR, URINE: YELLOW
GLUCOSE URINE: NEGATIVE MG/DL
KETONES, URINE: NEGATIVE
LEUKOCYTE CLUMPS, URINE: ABNORMAL
NITRITE, URINE: NEGATIVE
PH, URINE: 7.5 (ref 5–9)
PROTEIN, URINE: NEGATIVE MG/DL
SPECIFIC GRAVITY, URINE: 1.01 (ref 1–1.03)
UROBILINOGEN, URINE: 0.2 EU/DL (ref 0–1)

## 2024-03-22 PROCEDURE — 81003 URINALYSIS AUTO W/O SCOPE: CPT

## 2024-03-22 PROCEDURE — 99212 OFFICE O/P EST SF 10 MIN: CPT

## 2024-03-22 NOTE — PROGRESS NOTES
[facility] [place] [facility]  [facility] UROLOGY  [address]  [address]  [facility] [address]  Dept: [phone]  Loc: [phone]  Visit Date: 3/22/2024    Patient:  Sobia Khan  YOB: 1980  Date: 3/22/2024      HPI  The patient is a 44 y.o. female who presents to the office for kidney stone follow-up.    Single kidney stone episode. Able to pass on her own. Sobia denies stone passage since her last visit.    Adhering to dietary restrictions as discussed. Previously drinking 4-5 cups of coffee daily, now she has stopped drinking coffee entirely. Previously drinking less than 30 oz of water daily, now increased to >60 oz daily. Denies consumption of tea or soda.    Pain Scale 0/10  UA: Small Blood.  Lab Results   Component Value Date/Time    COLORU Yellow 03/22/2024 08:45 AM    COLORU YELLOW 08/19/2023 08:46 AM    LABSPEC 1.015 03/22/2024 08:45 AM    LABPH 7.50 03/22/2024 08:45 AM    NITRU Negative 03/22/2024 08:45 AM    GLUCOSEU Negative 03/22/2024 08:45 AM    KETUA Negative 03/22/2024 08:45 AM    UROBILINOGEN 0.20 03/22/2024 08:45 AM    BILIRUBINUR Negative 03/22/2024 08:45 AM    BILIRUBINUR NEGATIVE 02/07/2012 09:40 PM          Urology History    -History of hematuria: No  -Personal use or history of blood thinner use: No  -Personal or family history of bladder cancer: No  -History of smoking: No  -History of urinary retention: No  -History of kidney stones: Yes  - Surgical history: No  -Previous cystoscopy: No  -Emptying bladder: No         Last BUN and creatinine:  Lab Results   Component Value Date    BUN 6 (L) 08/22/2023     Lab Results   Component Value Date    CREATININE 0.6 08/22/2023       Imaging Reviewed during this Office Visit:   (results were independently reviewed by physician and radiology report verified)  I independently reviewed and verified the images and reports from:    Renal US Complete     FINDINGS: The right kidney measures 10.7 x 5.3 x

## 2024-09-11 ENCOUNTER — OFFICE VISIT (OUTPATIENT)
Dept: FAMILY MEDICINE CLINIC | Age: 44
End: 2024-09-11
Payer: COMMERCIAL

## 2024-09-11 VITALS
RESPIRATION RATE: 16 BRPM | DIASTOLIC BLOOD PRESSURE: 70 MMHG | SYSTOLIC BLOOD PRESSURE: 112 MMHG | WEIGHT: 183.3 LBS | BODY MASS INDEX: 27.15 KG/M2 | HEART RATE: 68 BPM | HEIGHT: 69 IN

## 2024-09-11 DIAGNOSIS — G89.29 CHRONIC RIGHT HIP PAIN: ICD-10-CM

## 2024-09-11 DIAGNOSIS — Z00.00 WELL ADULT HEALTH CHECK: Primary | ICD-10-CM

## 2024-09-11 DIAGNOSIS — E66.3 OVERWEIGHT (BMI 25.0-29.9): ICD-10-CM

## 2024-09-11 DIAGNOSIS — M25.551 CHRONIC RIGHT HIP PAIN: ICD-10-CM

## 2024-09-11 PROCEDURE — 99396 PREV VISIT EST AGE 40-64: CPT | Performed by: FAMILY MEDICINE

## 2024-09-11 RX ORDER — MELOXICAM 15 MG/1
15 TABLET ORAL DAILY
Qty: 30 TABLET | Refills: 0 | Status: SHIPPED | OUTPATIENT
Start: 2024-09-11

## 2024-09-11 RX ORDER — PHENTERMINE HYDROCHLORIDE 37.5 MG/1
37.5 TABLET ORAL
Qty: 30 TABLET | Refills: 0 | Status: SHIPPED | OUTPATIENT
Start: 2024-09-11 | End: 2024-10-11

## 2024-09-11 SDOH — ECONOMIC STABILITY: FOOD INSECURITY: WITHIN THE PAST 12 MONTHS, THE FOOD YOU BOUGHT JUST DIDN'T LAST AND YOU DIDN'T HAVE MONEY TO GET MORE.: NEVER TRUE

## 2024-09-11 SDOH — ECONOMIC STABILITY: FOOD INSECURITY: WITHIN THE PAST 12 MONTHS, YOU WORRIED THAT YOUR FOOD WOULD RUN OUT BEFORE YOU GOT MONEY TO BUY MORE.: NEVER TRUE

## 2024-09-11 SDOH — ECONOMIC STABILITY: INCOME INSECURITY: HOW HARD IS IT FOR YOU TO PAY FOR THE VERY BASICS LIKE FOOD, HOUSING, MEDICAL CARE, AND HEATING?: NOT HARD AT ALL

## 2024-09-11 ASSESSMENT — PATIENT HEALTH QUESTIONNAIRE - PHQ9
SUM OF ALL RESPONSES TO PHQ QUESTIONS 1-9: 1
SUM OF ALL RESPONSES TO PHQ QUESTIONS 1-9: 1
1. LITTLE INTEREST OR PLEASURE IN DOING THINGS: SEVERAL DAYS
SUM OF ALL RESPONSES TO PHQ QUESTIONS 1-9: 1
2. FEELING DOWN, DEPRESSED OR HOPELESS: NOT AT ALL
2. FEELING DOWN, DEPRESSED OR HOPELESS: NOT AT ALL
SUM OF ALL RESPONSES TO PHQ9 QUESTIONS 1 & 2: 1
SUM OF ALL RESPONSES TO PHQ QUESTIONS 1-9: 1
1. LITTLE INTEREST OR PLEASURE IN DOING THINGS: SEVERAL DAYS
SUM OF ALL RESPONSES TO PHQ9 QUESTIONS 1 & 2: 1

## 2024-09-11 ASSESSMENT — ENCOUNTER SYMPTOMS
GASTROINTESTINAL NEGATIVE: 1
RESPIRATORY NEGATIVE: 1

## 2024-09-27 ENCOUNTER — LAB (OUTPATIENT)
Dept: LAB | Age: 44
End: 2024-09-27

## 2024-09-27 DIAGNOSIS — Z00.00 WELL ADULT HEALTH CHECK: ICD-10-CM

## 2024-09-27 LAB
CHOLEST SERPL-MCNC: 193 MG/DL (ref 100–199)
GLUCOSE FASTING: 77 MG/DL (ref 70–108)
HDLC SERPL-MCNC: 71 MG/DL
LDLC SERPL CALC-MCNC: 106 MG/DL
TRIGL SERPL-MCNC: 78 MG/DL (ref 0–199)
TSH SERPL DL<=0.005 MIU/L-ACNC: 2.03 UIU/ML (ref 0.4–4.2)

## 2024-10-09 ENCOUNTER — OFFICE VISIT (OUTPATIENT)
Dept: FAMILY MEDICINE CLINIC | Age: 44
End: 2024-10-09
Payer: COMMERCIAL

## 2024-10-09 VITALS
WEIGHT: 170.2 LBS | HEIGHT: 69 IN | SYSTOLIC BLOOD PRESSURE: 108 MMHG | RESPIRATION RATE: 16 BRPM | DIASTOLIC BLOOD PRESSURE: 64 MMHG | BODY MASS INDEX: 25.21 KG/M2 | HEART RATE: 72 BPM

## 2024-10-09 DIAGNOSIS — E66.3 OVERWEIGHT (BMI 25.0-29.9): ICD-10-CM

## 2024-10-09 PROBLEM — N20.0 KIDNEY STONES, CALCIUM OXALATE: Status: ACTIVE | Noted: 2024-10-09

## 2024-10-09 PROCEDURE — 99213 OFFICE O/P EST LOW 20 MIN: CPT | Performed by: FAMILY MEDICINE

## 2024-10-09 PROCEDURE — G2211 COMPLEX E/M VISIT ADD ON: HCPCS | Performed by: FAMILY MEDICINE

## 2024-10-09 RX ORDER — PHENTERMINE HYDROCHLORIDE 37.5 MG/1
37.5 TABLET ORAL
Qty: 30 TABLET | Refills: 0 | Status: SHIPPED | OUTPATIENT
Start: 2024-10-09 | End: 2024-11-08

## 2024-10-09 ASSESSMENT — ENCOUNTER SYMPTOMS
GASTROINTESTINAL NEGATIVE: 1
RESPIRATORY NEGATIVE: 1

## 2024-10-09 NOTE — PROGRESS NOTES
Sobia Khan (:  1980) is a 44 y.o. female,Established patient, here for evaluation of the following chief complaint(s):  1 Month Follow-Up (Hip pain /)          Subjective   SUBJECTIVE/OBJECTIVE:  HPI  Chief Complaint   Patient presents with    1 Month Follow-Up     Hip pain        1 month eval.     Weight is down 13 lbs.  Tolerating Adipex fine.  Wt Readings from Last 3 Encounters:   10/09/24 77.2 kg (170 lb 3.2 oz)   24 83.1 kg (183 lb 4.8 oz)   24 80.3 kg (177 lb)       Patient Active Problem List   Diagnosis    Kidney stones, calcium oxalate       Current Outpatient Medications   Medication Sig Dispense Refill    phentermine (ADIPEX-P) 37.5 MG tablet Take 1 tablet by mouth every morning (before breakfast) for 30 days. Max Daily Amount: 37.5 mg 30 tablet 0     No current facility-administered medications for this visit.       Past Surgical History:   Procedure Laterality Date    BREAST ENHANCEMENT SURGERY Bilateral     2019       Review of Systems   Constitutional: Negative.    HENT: Negative.     Respiratory: Negative.     Cardiovascular: Negative.    Gastrointestinal: Negative.    Musculoskeletal: Negative.    All other systems reviewed and are negative.         Objective   Physical Exam  Vitals and nursing note reviewed.   Constitutional:       General: She is not in acute distress.     Appearance: Normal appearance. She is well-developed.   HENT:      Head: Normocephalic and atraumatic.      Right Ear: Tympanic membrane normal.      Left Ear: Tympanic membrane normal.   Eyes:      Conjunctiva/sclera: Conjunctivae normal.   Cardiovascular:      Rate and Rhythm: Normal rate and regular rhythm.      Heart sounds: Normal heart sounds. No murmur heard.  Pulmonary:      Effort: Pulmonary effort is normal.      Breath sounds: Normal breath sounds. No wheezing, rhonchi or rales.   Abdominal:      General: There is no distension.   Musculoskeletal:      Cervical back: Neck supple.   Skin:

## 2024-12-03 ENCOUNTER — HOSPITAL ENCOUNTER (OUTPATIENT)
Dept: MAMMOGRAPHY | Age: 44
Discharge: HOME | End: 2024-12-03
Payer: COMMERCIAL

## 2024-12-03 DIAGNOSIS — Z12.39 SCREENING BREAST EXAMINATION: ICD-10-CM

## 2024-12-03 PROCEDURE — 77067 SCR MAMMO BI INCL CAD: CPT

## 2025-05-27 LAB
CHOLEST SERPL-MCNC: 181 MG/DL (ref 0–199)
FASTING: YES
GLUCOSE SERPL-MCNC: 90 MG/DL (ref 74–109)
HDLC SERPL-MCNC: 67 MG/DL (ref 40–90)
LDLC SERPL CALC-MCNC: 100 MG/DL
TRIGL SERPL-MCNC: 72 MG/DL (ref 0–199)

## 2025-07-11 RX ORDER — BUPROPION HYDROCHLORIDE 150 MG/1
150 TABLET ORAL EVERY MORNING
Qty: 30 TABLET | Refills: 0 | Status: SHIPPED | OUTPATIENT
Start: 2025-07-11